# Patient Record
Sex: FEMALE | Race: WHITE
[De-identification: names, ages, dates, MRNs, and addresses within clinical notes are randomized per-mention and may not be internally consistent; named-entity substitution may affect disease eponyms.]

---

## 2018-01-25 ENCOUNTER — HOSPITAL ENCOUNTER (OUTPATIENT)
Dept: HOSPITAL 59 - HOP | Age: 53
Discharge: HOME | End: 2018-01-25
Attending: INTERNAL MEDICINE
Payer: COMMERCIAL

## 2018-01-25 DIAGNOSIS — Z86.010: ICD-10-CM

## 2018-01-25 DIAGNOSIS — K57.90: ICD-10-CM

## 2018-01-25 DIAGNOSIS — Z12.11: Primary | ICD-10-CM

## 2018-01-25 DIAGNOSIS — D12.5: ICD-10-CM

## 2018-01-26 NOTE — OPERATIVE NOTE
DATE OF SURGERY:   01/25/2018



OPERATION: COLONOSCOPY with cold snare polypectomy x2. 



PREOPERATIVE DIAGNOSIS: Personal history of sessile serrated adenoma. 



POSTOPERATIVE DIAGNOSES: 

1. Sigmoid colon polyps x2. 

2. Left-sided diverticula. 



PROCEDURE: After informed consent was obtained from the patient, she was placed in the left lateral 
decubitus position in the endoscopy suite, sedated and monitored by the department of anesthesia. 
Digital rectal exam was unremarkable. A well-lubricated [id number] colonoscope was inserted into the 
rectum and advanced to the cecum. Preparation quality was good to excellent. The cecum, ileocecal 
valve, appendiceal orifice, ascending colon, and transverse colon were free of inflammatory changes, 
mass lesions, or polyps. The descending colon and sigmoid colon revealed scattered diverticula of 
mild-to-moderate severity. There were 2 sessile polyps in the sigmoid colon each removed with a cold 
snare with minimal bleeding noted. J-turn views of the anorectum were unremarkable. Forward views of 
the rectum were unremarkable. The endoscope was straightened, the rectal ampulla deflated, and the 
endoscope was removed. 



RECOMMENDATIONS: I would suggest the patient follow a high-fiber diet. I would recommend a repeat 
exam in 3-5 years, most likely 5 years but a final determination to be based on tissue histology. 



As always, thank you for allowing me to participate in the healthcare of your patients. CC: Dr. Larry FELICIANO